# Patient Record
Sex: MALE | Race: WHITE | Employment: FULL TIME | URBAN - METROPOLITAN AREA
[De-identification: names, ages, dates, MRNs, and addresses within clinical notes are randomized per-mention and may not be internally consistent; named-entity substitution may affect disease eponyms.]

---

## 2021-04-27 ENCOUNTER — HOSPITAL ENCOUNTER (OUTPATIENT)
Dept: INTERVENTIONAL RADIOLOGY/VASCULAR | Age: 46
Discharge: HOME | End: 2021-04-29
Payer: COMMERCIAL

## 2021-04-27 DIAGNOSIS — D45 PV (POLYCYTHEMIA VERA) (HCC): ICD-10-CM

## 2021-04-27 DIAGNOSIS — G60.3 IDIOPATHIC PROGRESSIVE NEUROPATHY: ICD-10-CM

## 2021-04-27 PROCEDURE — 93925 LOWER EXTREMITY STUDY: CPT

## 2021-05-13 ENCOUNTER — OFFICE VISIT (OUTPATIENT)
Dept: NEUROLOGY | Age: 46
End: 2021-05-13
Payer: COMMERCIAL

## 2021-05-13 VITALS
DIASTOLIC BLOOD PRESSURE: 82 MMHG | SYSTOLIC BLOOD PRESSURE: 130 MMHG | HEIGHT: 69 IN | WEIGHT: 264 LBS | BODY MASS INDEX: 39.1 KG/M2

## 2021-05-13 DIAGNOSIS — M54.17 LUMBOSACRAL RADICULOPATHY: ICD-10-CM

## 2021-05-13 DIAGNOSIS — Z72.0 TOBACCO USE: Chronic | ICD-10-CM

## 2021-05-13 DIAGNOSIS — I10 ESSENTIAL HYPERTENSION: Chronic | ICD-10-CM

## 2021-05-13 DIAGNOSIS — R20.2 PARESTHESIAS: ICD-10-CM

## 2021-05-13 DIAGNOSIS — D45 POLYCYTHEMIA VERA (HCC): Primary | ICD-10-CM

## 2021-05-13 PROBLEM — G60.9 IDIOPATHIC PERIPHERAL NEUROPATHY: Status: ACTIVE | Noted: 2021-05-13

## 2021-05-13 PROBLEM — G60.9 IDIOPATHIC PERIPHERAL NEUROPATHY: Status: RESOLVED | Noted: 2021-05-13 | Resolved: 2021-05-13

## 2021-05-13 PROBLEM — E78.5 HYPERLIPIDEMIA: Status: ACTIVE | Noted: 2021-05-13

## 2021-05-13 PROCEDURE — 95910 NRV CNDJ TEST 7-8 STUDIES: CPT | Performed by: PSYCHIATRY & NEUROLOGY

## 2021-05-13 PROCEDURE — 95886 MUSC TEST DONE W/N TEST COMP: CPT | Performed by: PSYCHIATRY & NEUROLOGY

## 2021-05-13 NOTE — PROGRESS NOTES
[address] [facility]  Electrodiagnostic Laboratory  *Accredited by the [facility] with exemplary status  [address] [facility]  Phone: [phone]  Fax: [phone]    Referring Provider: Maximiliano Chew DO  Primary Care Physician: Tasha Haney DO  Patient Name: Sabrina Conway  Patient YOB: 1975  Gender: male  BMI: Body mass index is 38.99 kg/m². Blood pressure 130/82, height 5' 9\" (1.753 m), weight 264 lb (119.7 kg). 5/13/2021    Description of clinical problem: referred for idiopathic progressive neuropathy; hx paresthesias, polycythemia vera, hypertension, hyperlipidemia, obesity. Chief Complaint   Patient presents with    Procedure     EMG     Pain No   ; Numbness/tingling  Yes; Weakness  No       Brief physical exam:   Sensory deficit No; Weakness No; Atrophy  No; Reflex abnormality No  Neurologic exam performed of the right and left lower extremity shows grossly intact 5/5 power in all muscle groups with normal motor tone. DTRs: 12+ and symmetric. No ankle clonus, plantar responses are flexor. Sensory exam to light touch and sharp stick testing is grossly intact subjectively throughout. Musculoskeletal: Negative bilateral straight leg raising test.  No foot drop. Study Limitations:  None. Full Name: Angelo Duarte Gender: Male  MRN: [id number] YOB: 1975      Visit Date: 5/13/2021 10:53  Age: 55 Years 1 Months Old  Examining Physician: Dr. Aron Matson  Referring Physician: Dr. Brxaton Milan  Technician: Nora Hatfield  Height: 5 feet 9 inch  Weight: 265 lbs  Notes: Neuropathy      Motor NCS      Nerve / Sites Latency Amplitude Amp. 1-2 Distance Lat Diff Velocity Temp.    ms mV % cm ms m/s °C   R Peroneal - EDB      Ankle 3.80 5.0 100 8   29      Pop fossa 12.60 4.2 84.7 38 8.80 43 29   L Peroneal - EDB      Ankle 4.22 4.5 100 8   32.6      Pop fossa 13.33 5.5 122 38 9.11 42 32.7   R Tibial - AH      Ankle 3.28 4.6 100 8   32.9      Pop fossa 13.07 4.4 95.6 42 9.79 43 33             Sensory NCS      Nerve / Sites Onset Lat Peak Lat PP Amp Amp. 1-2 Distance Velocity Temp.    ms ms µV % cm m/s °C   L Superficial peroneal - Ankle      Lat leg 2.19 2.86 7.0 100 8 37 32.9   R Sural - Ankle (Calf)      Calf 3.44 3.91 10.7 100 14 41 32.9   R Superficial peroneal - Ankle      Lat leg 2.24 2.97 16.2 100 10 45 32.9             F  Wave      Nerve F Lat M Lat F-M Lat    ms ms ms   R Peroneal - EDB 50.3 4.3 45.9   R Tibial - AH 53.8 3.5 50.3   L Peroneal - EDB 50.5 4.1 46.4       H Reflex      Nerve Lat Hmax    ms   R Tibial - Soleus 31.3   L Tibial - Soleus 32. 2       EMG         EMG Summary Table     Spontaneous MUAP Recruitment   Muscle IA Fib PSW Fasc H.F. Amp Dur. PPP Pattern   R. Tibialis anterior Normal None None None None Normal Normal None Normal   R. Gastroc (Medial head) Sl Incr 1+ None None None 2+ 1+ None Mild-Few   R. Flexor digitorum longus Incr None Few None None 2+ 2+ None Decr   R. Extensor digitorum brevis Normal None None None None 2+ 3+ None Decr   R. Abductor hallucis Normal None None None None Normal 2+ None Decr   R. Vastus lateralis Normal None None None None 1+ 1+ None Decr   L. Tibialis anterior Incr None Few None None Normal Normal None Normal   L. Gastroc (Medial head) Sl Incr None 1+ None None 1+ 1+ None Decr   L. Flexor digitorum longus Normal None None None None Giant 2+ None Decr   L. Extensor digitorum brevis Incr None 2+ None None Giant 2+ None Decr   L. Vastus lateralis Normal None None None None 1+ 1+ None Decr   R. Gluteus medius Normal None None None None Normal Normal None Normal   R. Sacral paraspinals Incr None 1+ None None -- -- -- --   R. Lumbar paraspinals (low) Incr None 1+ None None -- -- -- --       Summary of Findings:   Nerve conduction studies:   · All nerve conduction studies listed in the table above were normal in latency, amplitude and conduction velocity.       Needle EMG:   · Needle EMG was performed using a

## 2022-06-10 ENCOUNTER — OFFICE VISIT (OUTPATIENT)
Dept: PAIN MANAGEMENT | Age: 47
End: 2022-06-10
Payer: COMMERCIAL

## 2022-06-10 VITALS
OXYGEN SATURATION: 97 % | TEMPERATURE: 98.3 F | RESPIRATION RATE: 16 BRPM | HEART RATE: 92 BPM | SYSTOLIC BLOOD PRESSURE: 135 MMHG | HEIGHT: 68 IN | DIASTOLIC BLOOD PRESSURE: 88 MMHG | BODY MASS INDEX: 40.16 KG/M2 | WEIGHT: 265 LBS

## 2022-06-10 DIAGNOSIS — M54.16 LUMBAR RADICULOPATHY: ICD-10-CM

## 2022-06-10 DIAGNOSIS — Z72.0 TOBACCO USE: ICD-10-CM

## 2022-06-10 DIAGNOSIS — Z78.9 ALCOHOL USE: ICD-10-CM

## 2022-06-10 DIAGNOSIS — G89.29 CHRONIC LEFT-SIDED LOW BACK PAIN WITHOUT SCIATICA: ICD-10-CM

## 2022-06-10 DIAGNOSIS — G89.4 CHRONIC PAIN SYNDROME: Primary | ICD-10-CM

## 2022-06-10 DIAGNOSIS — M54.50 CHRONIC LEFT-SIDED LOW BACK PAIN WITHOUT SCIATICA: ICD-10-CM

## 2022-06-10 DIAGNOSIS — G62.9 SMALL FIBER NEUROPATHY: ICD-10-CM

## 2022-06-10 PROCEDURE — 99204 OFFICE O/P NEW MOD 45 MIN: CPT | Performed by: PAIN MEDICINE

## 2022-06-10 PROCEDURE — 99204 OFFICE O/P NEW MOD 45 MIN: CPT

## 2022-06-10 PROCEDURE — 99205 OFFICE O/P NEW HI 60 MIN: CPT | Performed by: PAIN MEDICINE

## 2022-06-10 RX ORDER — PREGABALIN 150 MG/1
150 CAPSULE ORAL 2 TIMES DAILY
Qty: 60 CAPSULE | Refills: 0 | Status: SHIPPED | OUTPATIENT
Start: 2022-06-17 | End: 2022-07-17

## 2022-06-10 RX ORDER — PREGABALIN 75 MG/1
75 CAPSULE ORAL 2 TIMES DAILY
Qty: 14 CAPSULE | Refills: 0 | Status: SHIPPED | OUTPATIENT
Start: 2022-06-10 | End: 2022-06-17

## 2022-06-10 RX ORDER — HYDROCODONE BITARTRATE AND ACETAMINOPHEN 5; 325 MG/1; MG/1
TABLET ORAL
COMMUNITY
Start: 2022-04-26

## 2022-06-10 RX ORDER — MONTELUKAST SODIUM 10 MG/1
TABLET ORAL
COMMUNITY
Start: 2022-03-11

## 2022-06-10 RX ORDER — ALBUTEROL SULFATE 90 UG/1
AEROSOL, METERED RESPIRATORY (INHALATION)
COMMUNITY
Start: 2022-05-03

## 2022-06-10 NOTE — PROGRESS NOTES
[facility]        [address]  [address] [facility]  Dept: [phone]        Patient:  NIK Andersen 1975    Date of Service:  06/10/22     Requesting Physician:  Carolyn Yun DO    Reason for Consult:      Patient presents with complaints of left back and b/l toe pain that started 2 years ago    HISTORY OF PRESENT ILLNESS:      Pain is intermittent and is described as aching and throbbing. Pain does not radiate to both lower extremities. He  has numbness, tingling of the b/l toes and does not have bladder or bowel dysfunction. Alleviating factors include: nothing. Aggravating factors include:  walking, standing, letting legs dangle. He has been on anticoagulation medications to include ASA and has not been on herbal supplements. He is not diabetic. Imagin/2021 EDX -  Diagnostic Interpretation:      This study was abnormal.   Electrodiagnosis: NCS/EMG examination performed of the right and left lower extremity shows electrodiagnostic evidence for the followin. A chronic right and left lumbosacral radiculopathy (I.e., primarily L5/S1 myotomal distribution) with mild ongoing and chronic denervation of a moderately severe degree.     There is no electrodiagnostic evidence of a chronic sensorimotor peripheral neuropathy.     Note: A small fiber neuropathy cannot be assessed by means of electrodiagnostic testing.     Clinical correlation is recommended.     Previous Study: None known     Technologist: SIM  Physician: Yessica Weinstein MD    3/2022 CT lumbar -      Previous treatments: medications.       Past Medical History:   Diagnosis Date    Essential hypertension 2021    Hyperlipidemia 2021    Hypertension     Idiopathic peripheral neuropathy 2021    Lumbosacral radiculopathy 2021    Paresthesias 2021    Tobacco use 2021       Past Surgical History:   Procedure Laterality Date    TONSILLECTOMY AND ADENOIDECTOMY         Prior to Admission medications    Medication Sig Start Date End Date Taking? Authorizing Provider   HYDROcodone-acetaminophen (NORCO) 5-325 MG per tablet TAKE 1 TABLET BY MOUTH EVERY 8 HOURS AS NEEDED FOR PAIN FOR 30 DAYS 4/26/22  Yes Historical Provider, MD   albuterol sulfate HFA (PROVENTIL;VENTOLIN;PROAIR) 108 (90 Base) MCG/ACT inhaler INHALE 2 PUFFS BY MOUTH EVERY 6 HOURS 5/3/22  Yes Historical Provider, MD   montelukast (SINGULAIR) 10 MG tablet TAKE 1 TABLET BY MOUTH DAILY 3/11/22  Yes Historical Provider, MD   amLODIPine (NORVASC) 10 MG tablet Take 10 mg by mouth daily  1/25/16  Yes Historical Provider, MD   lisinopril (PRINIVIL;ZESTRIL) 10 MG tablet Take 10 mg by mouth daily  1/15/16  Yes Historical Provider, MD   aspirin 81 MG tablet Take 81 mg by mouth daily   Yes Historical Provider, MD       Allergies   Allergen Reactions    Pcn [Penicillins] Swelling       Social History     Socioeconomic History    Marital status: Single     Spouse name: Not on file    Number of children: Not on file    Years of education: Not on file    Highest education level: Not on file   Occupational History    Not on file   Tobacco Use    Smoking status: Current Every Day Smoker     Packs/day: 1.00     Types: Cigarettes    Smokeless tobacco: Never Used   Substance and Sexual Activity    Alcohol use: Yes     Alcohol/week: 6.0 standard drinks     Types: 6 Cans of beer per week    Drug use: No    Sexual activity: Yes     Partners: Female   Other Topics Concern    Not on file   Social History Narrative    Not on file     Social Determinants of Health     Financial Resource Strain:     Difficulty of Paying Living Expenses: Not on file   Food Insecurity:     Worried About Running Out of Food in the Last Year: Not on file    Armando of Food in the Last Year: Not on file   Transportation Needs:     Lack of Transportation (Medical): Not on file    Lack of Transportation (Non-Medical):  Not on file Physical Activity:     Days of Exercise per Week: Not on file    Minutes of Exercise per Session: Not on file   Stress:     Feeling of Stress : Not on file   Social Connections:     Frequency of Communication with Friends and Family: Not on file    Frequency of Social Gatherings with Friends and Family: Not on file    Attends [employer] Services: Not on file    Active Member of [address] [place] [address] or Organizations: Not on file    Attends Club or Organization Meetings: Not on file    Marital Status: Not on file   Intimate Partner Violence:     Fear of Current or Ex-Partner: Not on file    Emotionally Abused: Not on file    Physically Abused: Not on file    Sexually Abused: Not on file   Housing Stability:     Unable to Pay for Housing in the Last Year: Not on file    Number of [place] in the Last Year: Not on file    Unstable Housing in the Last Year: Not on file       Family History   Problem Relation Age of Onset    High Blood Pressure Mother     Cancer Father        REVIEW OF SYSTEMS:     Patient specifically denies fever/chills, chest pain, shortness of breath, new bowel or bladder complaints. All other review of systems was negative. PHYSICAL EXAMINATION:      /88   Pulse 92   Temp 98.3 °F (36.8 °C)   Resp 16   Ht 5' 8\" (1.727 m)   Wt 265 lb (120.2 kg)   SpO2 97%   BMI 40.29 kg/m²     General:      General appearance:pleasant and well-hydrated, in no distress and A & O x3  Build:Overweight  Function:Rises from a seated position with difficulty    HEENT:    Head:normocephalic, atraumatic  Pupils:regular, round, equal  Sclera: icterus absent    Lungs:    Breathing:normal breathing pattern    Abdomen:    Shape:non-distended  Tenderness:none  Guarding:none    Lumbar spine:    Spine inspection:normal   CVA tenderness:No   Palpation:tenderness paravertebral muscles, left positive, right negative.   Range of motion:abnormal mildly in lateral bending, flexion, extension rotation bilateral and is painful. Musculoskeletal:    Trigger points in Paraveteral:absent bilaterally  SI joint tenderness:negative right, negative left              SEAN test:not done right, not done left  Piriformis tenderness:negative right, negative left  Trochanteric bursa tenderness:negative right, negative left  SLR:negative right, negative left, sitting     Extremities:    Tremors:None bilaterally upper and lower  Range of motion:pain with internal rotation of hips negative. Intact:Yes  Varicose veins:absent   Pulses:present Lt radial  Cyanosis:none  Edema:none x all 4 extremities    Neurological:    Sensory:decreased to light touch BLE in stocking distribution  Motor:                Right Quadriceps5/5          Left Quadriceps5/5           Right Gastrocnemius5/5    Left Gastrocnemius5/5  Right Ant Tibialis5/5  Left Ant Tibialis5/5    Reflexes:    Right Quadriceps reflex1+  Left Quadriceps reflex1+  Right Achilles reflex1+  Left Achilles reflex1+    Gait:normal station    Dermatology:    Skin:no rashes or lesions noted    Impression:    Left LBP  B/l diffuse toe pain, exam consistent with small fiber neuropathy  5/2021 EDX shows chronic right and left lumbosacral radiculopathy (I.e., primarily L5/S1 myotomal distribution) with mild ongoing and chronic denervation of a moderately severe degree.   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    Plan:    Discussed causes of SFN and contributions of his alcohol and tobacco use as well as polycythemia vera and his possible FILIPPO (pending a sleep study)  Reviewed referral documents and imaging  Urine screen today  OARRS report reviewed  Discussed avoidance of chronic opioid therapy, pt reports this has not been significantly helpful in the past  Pregabalin titration  Consider left L5 TFESI #1 prn  Referral to neurology for further eval of causes of SFN  Smoking cessation program  Encouraged in reduced alcohol consumption  Patient encouraged to stay active and to lose weight  Treatment plan discussed with the patient including medication and procedure side effects     Spent >60 minutes on this case with >50% of that time spent in face to face contact    CC:  Referring physician    EMMA Thacker.

## 2022-06-10 NOTE — PROGRESS NOTES
Do you currently have any of the following:    Fever: No  Headache:  No  Cough: No  Shortness of breath: No  Exposed to anyone with these symptoms: No         [address] Alejandra presents to the [facility] on 6/10/2022. Ivelisse is complaining of pain in his left low back and numbness in bilateral feet. The pain is intermittent. The pain is described as aching, throbbing and tiring. Pain is rated on his best day at a 0, on his worst day at a 8, and on average at a 5 on the VAS scale. He took his last dose of Norco five days ago. Any procedures since your last visit: No    Pacemaker or defibrillator: No     He is not on NSAIDS and is  on anticoagulation medications to include ASA and is managed by Kathryn Mix DO  .     Medication Contract and Consent for Opioid Use Documents Filed      No documents found                /88   Pulse 92   Temp 98.3 °F (36.8 °C)   Resp 16   Ht 5' 8\" (1.727 m)   Wt 265 lb (120.2 kg)   SpO2 97%   BMI 40.29 kg/m²      No LMP for male patient.

## 2022-07-27 ENCOUNTER — TELEPHONE (OUTPATIENT)
Dept: NEUROLOGY | Age: 47
End: 2022-07-27

## 2022-07-27 NOTE — TELEPHONE ENCOUNTER
From Referral WQ:    Referral from Dr. China Cardoso to any provider at the [facility] office. DX:  small fiber neuropathy. Pt with symptoms consistent with SFN, does have multiple identifiable causes (heavy alcohol use, tobacco use, polycythemia vera, possible FILIPPO pending sleep study, etc.), but he needs a thorough evaluation of all causes. Note EMG test done on 5/13/2021 by Dr Christina Grider. Please advise if we are able to schedule this patient.   Thank you

## 2024-06-11 ENCOUNTER — HOSPITAL ENCOUNTER (OUTPATIENT)
Age: 49
Discharge: HOME | End: 2024-06-13

## 2024-06-20 LAB — SURGICAL PATHOLOGY REPORT: NORMAL
